# Patient Record
Sex: FEMALE | Race: WHITE | Employment: UNEMPLOYED | URBAN - METROPOLITAN AREA
[De-identification: names, ages, dates, MRNs, and addresses within clinical notes are randomized per-mention and may not be internally consistent; named-entity substitution may affect disease eponyms.]

---

## 2022-01-11 ENCOUNTER — TELEPHONE (OUTPATIENT)
Dept: RHEUMATOLOGY | Facility: CLINIC | Age: 77
End: 2022-01-11

## 2022-01-11 NOTE — TELEPHONE ENCOUNTER
Dr. Wilson Carrel office called wanting to update Dr. Jun Valencia on a new pt he has on his schedule.     Future Appointments   Date Time Provider Bettina Delgadillo   9/3/9187  3:63 PM [facility]

## 2022-01-13 ENCOUNTER — OFFICE VISIT (OUTPATIENT)
Dept: RHEUMATOLOGY | Facility: CLINIC | Age: 77
End: 2022-01-13
Payer: MEDICARE

## 2022-01-13 VITALS
HEIGHT: 64 IN | TEMPERATURE: 97 F | RESPIRATION RATE: 16 BRPM | DIASTOLIC BLOOD PRESSURE: 60 MMHG | SYSTOLIC BLOOD PRESSURE: 102 MMHG | WEIGHT: 126 LBS | BODY MASS INDEX: 21.51 KG/M2 | HEART RATE: 84 BPM

## 2022-01-13 DIAGNOSIS — L94.3 SCLERODACTYLY: ICD-10-CM

## 2022-01-13 DIAGNOSIS — M85.852 OSTEOPENIA OF NECK OF LEFT FEMUR: ICD-10-CM

## 2022-01-13 DIAGNOSIS — I10 ESSENTIAL HYPERTENSION: ICD-10-CM

## 2022-01-13 DIAGNOSIS — I73.00 RAYNAUD'S DISEASE WITHOUT GANGRENE: ICD-10-CM

## 2022-01-13 DIAGNOSIS — M34.9 SCLERODERMA (HCC): ICD-10-CM

## 2022-01-13 DIAGNOSIS — L98.491 ULCER OF FINGER, LIMITED TO BREAKDOWN OF SKIN (HCC): Primary | ICD-10-CM

## 2022-01-13 PROBLEM — E78.2 MIXED HYPERLIPIDEMIA: Status: ACTIVE | Noted: 2022-01-13

## 2022-01-13 PROBLEM — M85.859 OSTEOPENIA OF NECK OF FEMUR: Status: ACTIVE | Noted: 2022-01-13

## 2022-01-13 PROCEDURE — 99205 OFFICE O/P NEW HI 60 MIN: CPT | Performed by: INTERNAL MEDICINE

## 2022-01-13 RX ORDER — VALSARTAN 320 MG/1
320 TABLET ORAL DAILY
COMMUNITY
Start: 2021-11-19

## 2022-01-13 RX ORDER — CEPHALEXIN 500 MG/1
500 CAPSULE ORAL 3 TIMES DAILY
COMMUNITY
Start: 2022-01-09 | End: 2022-02-07 | Stop reason: ALTCHOICE

## 2022-01-13 RX ORDER — FOLIC ACID 1 MG/1
1 TABLET ORAL DAILY
Qty: 30 TABLET | Refills: 2 | Status: SHIPPED | OUTPATIENT
Start: 2022-01-13 | End: 2022-02-07

## 2022-01-13 RX ORDER — METHOTREXATE 2.5 MG/1
15 TABLET ORAL WEEKLY
Qty: 30 TABLET | Refills: 3 | Status: SHIPPED | OUTPATIENT
Start: 2022-01-13 | End: 2022-02-07

## 2022-01-13 RX ORDER — PREDNISONE 10 MG/1
50 TABLET ORAL DAILY
COMMUNITY
Start: 2022-01-07

## 2022-01-13 RX ORDER — IBUPROFEN 200 MG
1 CAPSULE ORAL 2 TIMES DAILY
COMMUNITY

## 2022-01-13 NOTE — PATIENT INSTRUCTIONS
Start Methotrexate 6 tablets per week for prevention of scleroderma progression. Methotrexate is a disease modifying immunosuppressive agent.   Hopefully it will decrease the inflammation in your body, therefore lancing the tendency for you to develop tigh

## 2022-01-13 NOTE — PROGRESS NOTES
[facility]  Report of Consultation    [facility] Patient Status:  No patient class for patient encounter    1945 MRN [id number]   Location [address] [place] [address] PCP No primary care provider on file.      Date of Consult:  22    Reason verapamil for last few days too. Jerry Zayas History:  PMH:       Essential hypertension. Mitral valve prolapse. Hyperlipidemia. Osteoporosis. Raynaud's     PSH:        1971  section.   [address] External Cream, APPLY TO AFFECTED AREA EVERY DAY (Patient not taking: Reported on 1/13/2022), Disp: 45 g, Rfl: 0  •  tacrolimus (PROTOPIC) 0.1 % Apply Externally Ointment, Apply to aa BID (Patient not taking: Reported on 1/13/2022), Disp: 100 g, Rfl: 0  • hematocrit 42.5 MCV 98 platelet count 135,553. Sed rate 4.  C-reactive protein 1.6. This is normal for [facility].   Normal ranges 0-10.  9/14/2021 sodium 140 potassium 4.3 chloride 104 BUN 24 creatinine 0.71 GFR 80 calcium 9.6 glucose 92 total protein as methotrexate.     Ulcer of finger, limited to breakdown of skin ([address]  (primary encounter diagnosis)  Sclerodactyly  Scleroderma ([address]  Raynaud's disease without gangrene  Osteopenia of neck of left femur  Essential hypertension    Recommendations:   Mary Borden

## 2022-02-03 ENCOUNTER — TELEPHONE (OUTPATIENT)
Dept: RHEUMATOLOGY | Facility: CLINIC | Age: 77
End: 2022-02-03

## 2022-02-03 NOTE — TELEPHONE ENCOUNTER
Spoke today with Dr. Adeel Paula about Kate Coad. Now Shaila Barry is dealing with some headaches, he put her on steroids the headaches improved. Suggested we recheck a sed rate and CRP make sure that her inflammation is under control, if she has a high sed rate over 50 with headaches and be concerned about need for a temporal artery biopsy. Also would like to see her in the office for recheck because recently appeared she had early scleroderma.   Dr. Chau Finn

## 2022-02-03 NOTE — TELEPHONE ENCOUNTER
Now dealing with headaches, when wakes up not severe. To be seen in office next Monday 2/7.   Dr. Golden Davenport.

## 2022-02-07 ENCOUNTER — OFFICE VISIT (OUTPATIENT)
Dept: RHEUMATOLOGY | Facility: CLINIC | Age: 77
End: 2022-02-07
Payer: MEDICARE

## 2022-02-07 VITALS
HEART RATE: 72 BPM | BODY MASS INDEX: 22.36 KG/M2 | RESPIRATION RATE: 16 BRPM | HEIGHT: 64 IN | SYSTOLIC BLOOD PRESSURE: 128 MMHG | TEMPERATURE: 97 F | WEIGHT: 131 LBS | DIASTOLIC BLOOD PRESSURE: 60 MMHG

## 2022-02-07 DIAGNOSIS — R53.1 WEAKNESS: ICD-10-CM

## 2022-02-07 DIAGNOSIS — L94.3 SCLERODACTYLY: Primary | ICD-10-CM

## 2022-02-07 DIAGNOSIS — I83.11 VARICOSE VEINS OF BOTH LOWER EXTREMITIES WITH INFLAMMATION: ICD-10-CM

## 2022-02-07 DIAGNOSIS — I83.12 VARICOSE VEINS OF BOTH LOWER EXTREMITIES WITH INFLAMMATION: ICD-10-CM

## 2022-02-07 DIAGNOSIS — I73.00 RAYNAUD'S DISEASE WITHOUT GANGRENE: ICD-10-CM

## 2022-02-07 DIAGNOSIS — I87.2 VENOUS INSUFFICIENCY OF LEFT LEG: ICD-10-CM

## 2022-02-07 DIAGNOSIS — E55.9 VITAMIN D DEFICIENCY: ICD-10-CM

## 2022-02-07 DIAGNOSIS — R51.9 NONINTRACTABLE HEADACHE, UNSPECIFIED CHRONICITY PATTERN, UNSPECIFIED HEADACHE TYPE: ICD-10-CM

## 2022-02-07 DIAGNOSIS — M34.9 SCLERODERMA (HCC): ICD-10-CM

## 2022-02-07 DIAGNOSIS — L98.491 ULCER OF FINGER, LIMITED TO BREAKDOWN OF SKIN (HCC): ICD-10-CM

## 2022-02-07 PROCEDURE — 99214 OFFICE O/P EST MOD 30 MIN: CPT | Performed by: INTERNAL MEDICINE

## 2022-02-07 RX ORDER — FOLIC ACID 1 MG/1
1 TABLET ORAL DAILY
Qty: 30 TABLET | Refills: 2 | Status: SHIPPED | OUTPATIENT
Start: 2022-02-07

## 2022-02-07 RX ORDER — METHOTREXATE 2.5 MG/1
15 TABLET ORAL WEEKLY
Qty: 30 TABLET | Refills: 3 | Status: SHIPPED | OUTPATIENT
Start: 2022-02-07 | End: 2022-02-07

## 2022-02-07 RX ORDER — FOLIC ACID 1 MG/1
1 TABLET ORAL DAILY
Qty: 30 TABLET | Refills: 2 | Status: CANCELLED | OUTPATIENT
Start: 2022-02-07

## 2022-02-07 RX ORDER — METHOTREXATE 2.5 MG/1
15 TABLET ORAL WEEKLY
Qty: 30 TABLET | Refills: 3 | Status: SHIPPED | OUTPATIENT
Start: 2022-02-07 | End: 2022-03-07

## 2022-02-07 RX ORDER — OMEPRAZOLE 20 MG/1
20 CAPSULE, DELAYED RELEASE ORAL
COMMUNITY

## 2022-02-07 RX ORDER — METHOTREXATE 2.5 MG/1
15 TABLET ORAL WEEKLY
Qty: 30 TABLET | Refills: 3 | Status: CANCELLED | OUTPATIENT
Start: 2022-02-07 | End: 2022-03-07

## 2022-02-08 ENCOUNTER — TELEPHONE (OUTPATIENT)
Dept: RHEUMATOLOGY | Facility: CLINIC | Age: 77
End: 2022-02-08

## 2022-02-17 ENCOUNTER — TELEPHONE (OUTPATIENT)
Dept: RHEUMATOLOGY | Facility: CLINIC | Age: 77
End: 2022-02-17

## 2022-02-17 NOTE — TELEPHONE ENCOUNTER
Received call from OSH radiology- pt has extensive superficial thrombophlebitis with extension to the common femoral. Not totally occlusive but does have features of subacute on chronic. I spoke to pt and recommended ER evaluation as she likely will require blood thinners. Pt would also like to speak to her normal rheumatologist.    I am covering for Dr. Lux Elena while he is out of the office. Notified Dr. Lux Elena and he will call pt/pt's  .      Shona Olvera, DO  [facility]  2/17/2022

## 2022-02-17 NOTE — TELEPHONE ENCOUNTER
Test results from [address] [place] [address]. Ultrasound of the head impression there is no halo sign visualized. Spectral broadening rapid upstroke slightly low resistive waveform seen in bilateral temporal arteries with forward flow end-diastolic this may be from atherosclerosis. Similar waveform in both right and left temporal arteries. No specific evidence for temporal arteritis. Of more concern was the left lower extremity deep venous duplex ultrasound. This showed #1 Short segment partially occlusive deep vein thrombus in the left common femoral vein at the saphenous femoral junction. #2 Long segment superficial thrombophlebitis with thrombosed entire left greater saphenous vein from the thigh to the ankle. Patient was notified. Referred to emergency room. Later I talked to  patient is at [facility] emergency room. Primary care physician taking over Dr. Laura Null.

## 2022-02-17 NOTE — TELEPHONE ENCOUNTER
Earlier today Terry Bettencourt went to [facility] for ultrasound of the head and ultrasound of the leg. Ultrasound of the leg showed that she had superficial blood clot which seem to be possibly extending into the deeper veins. Therefore she was told to go to the emergency room for evaluation and admission. This was by my partner Dr. Sage Bellamy who was on call and  took the original message. I have just talked to the . They decided to go to [facility] emergency room because her primary doctor is Dr. Shane Ayala and he is at [facility]. Padma Parson is in the emergency room being evaluated. Treatment will be determined by the emergency room [facility] obviously in conjunction with Dr. Neva Chaney. Results of the ultrasound the head are not known at this time by myself,  but they should be available to Dr. Neva Chaney at [facility] as [facility] is a [facility] as is [facility]. Dr. Neva Chaney to take over care of this case at this time.   Dr. Katherine Fortune

## 2022-03-02 ENCOUNTER — TELEPHONE (OUTPATIENT)
Dept: RHEUMATOLOGY | Facility: CLINIC | Age: 77
End: 2022-03-02

## 2022-03-02 NOTE — TELEPHONE ENCOUNTER
Dionne Montero from Dr Etienne Spore office called as Michael Parks pt had DVT and was hospitalized at [facility] 2/18/22. Records in [facility]. Routing to Dr Yulissa Pablo as Michael Parks.   Future Appointments   Date Time Provider Bettina Delgadillo   3/2/8639 37:71 PM Cherrie Gosselin, MD [facility]

## 2022-03-09 ENCOUNTER — OFFICE VISIT (OUTPATIENT)
Dept: RHEUMATOLOGY | Facility: CLINIC | Age: 77
End: 2022-03-09
Payer: MEDICARE

## 2022-03-09 VITALS
BODY MASS INDEX: 22.53 KG/M2 | HEIGHT: 64 IN | SYSTOLIC BLOOD PRESSURE: 122 MMHG | DIASTOLIC BLOOD PRESSURE: 58 MMHG | WEIGHT: 132 LBS | TEMPERATURE: 97 F | HEART RATE: 84 BPM | RESPIRATION RATE: 20 BRPM

## 2022-03-09 DIAGNOSIS — M34.9 SCLERODERMA (HCC): Primary | ICD-10-CM

## 2022-03-09 DIAGNOSIS — I83.11 VARICOSE VEINS OF BOTH LOWER EXTREMITIES WITH INFLAMMATION: ICD-10-CM

## 2022-03-09 DIAGNOSIS — I73.00 RAYNAUD'S DISEASE WITHOUT GANGRENE: ICD-10-CM

## 2022-03-09 DIAGNOSIS — I77.9 DIGITAL ARTERIAL OCCLUSIVE DISEASE (HCC): ICD-10-CM

## 2022-03-09 DIAGNOSIS — I82.412 ACUTE DEEP VEIN THROMBOSIS (DVT) OF FEMORAL VEIN OF LEFT LOWER EXTREMITY (HCC): ICD-10-CM

## 2022-03-09 DIAGNOSIS — I83.12 VARICOSE VEINS OF BOTH LOWER EXTREMITIES WITH INFLAMMATION: ICD-10-CM

## 2022-03-09 DIAGNOSIS — L98.491 ULCER OF FINGER, LIMITED TO BREAKDOWN OF SKIN (HCC): ICD-10-CM

## 2022-03-09 DIAGNOSIS — L94.3 SCLERODACTYLY: ICD-10-CM

## 2022-03-09 PROBLEM — I83.93 VARICOSE VEINS OF BOTH LOWER EXTREMITIES: Status: ACTIVE | Noted: 2022-03-09

## 2022-03-09 PROCEDURE — 99214 OFFICE O/P EST MOD 30 MIN: CPT | Performed by: INTERNAL MEDICINE

## 2022-03-09 RX ORDER — FOLIC ACID 1 MG/1
1 TABLET ORAL DAILY
Qty: 30 TABLET | Refills: 2 | Status: CANCELLED | OUTPATIENT
Start: 2022-03-09

## 2022-03-09 RX ORDER — PREDNISONE 1 MG/1
5 TABLET ORAL DAILY
Qty: 90 TABLET | Refills: 1 | Status: SHIPPED | OUTPATIENT
Start: 2022-03-09

## 2022-03-09 RX ORDER — METHOTREXATE 2.5 MG/1
20 TABLET ORAL WEEKLY
Qty: 40 TABLET | Refills: 3 | Status: SHIPPED | OUTPATIENT
Start: 2022-03-09 | End: 2022-04-06

## 2022-03-09 NOTE — PATIENT INSTRUCTIONS
PLan lower the Prednisone from 30 mg per day to 25 mg per day. In 2 weeks then try to lower Prednisone 20 mg per day. Increase Methotrexate to 8 tablets per week, take 4 on day, then 4 then next day, total of 8 per week. Folic acid 1 mg per day. Verapamil daily  Silendafil 20 mg twice a day. Eliquis twice a day. Stop nitroglycerin salve. Soak hands in warm water. Wear your cotton gloves. Dr Sandeep Cerna on Friday. Recheck labs in month. Return to office 1 month.

## 2022-04-11 ENCOUNTER — TELEPHONE (OUTPATIENT)
Dept: RHEUMATOLOGY | Facility: CLINIC | Age: 77
End: 2022-04-11

## 2022-04-11 ENCOUNTER — OFFICE VISIT (OUTPATIENT)
Dept: RHEUMATOLOGY | Facility: CLINIC | Age: 77
End: 2022-04-11
Payer: MEDICARE

## 2022-04-11 VITALS
BODY MASS INDEX: 22.71 KG/M2 | TEMPERATURE: 98 F | RESPIRATION RATE: 16 BRPM | WEIGHT: 133 LBS | HEART RATE: 64 BPM | SYSTOLIC BLOOD PRESSURE: 116 MMHG | DIASTOLIC BLOOD PRESSURE: 62 MMHG | HEIGHT: 64 IN

## 2022-04-11 DIAGNOSIS — M34.9 SCLERODERMA (HCC): Primary | ICD-10-CM

## 2022-04-11 DIAGNOSIS — I73.00 RAYNAUD'S DISEASE WITHOUT GANGRENE: ICD-10-CM

## 2022-04-11 DIAGNOSIS — L94.3 SCLERODACTYLY: ICD-10-CM

## 2022-04-11 PROCEDURE — 99214 OFFICE O/P EST MOD 30 MIN: CPT | Performed by: INTERNAL MEDICINE

## 2022-04-11 RX ORDER — FOLIC ACID 1 MG/1
1 TABLET ORAL DAILY
Qty: 30 TABLET | Refills: 2 | Status: CANCELLED | OUTPATIENT
Start: 2022-04-11

## 2022-04-11 RX ORDER — PREDNISONE 1 MG/1
5 TABLET ORAL DAILY
Qty: 90 TABLET | Refills: 1 | Status: CANCELLED | OUTPATIENT
Start: 2022-04-11

## 2022-04-11 NOTE — TELEPHONE ENCOUNTER
Discussed the case of Artice Meth with her primary Dr. Kirsten Larios. Concerned about kidney function being slightly up creatinine 1.36 or red count slightly down to 9.9. Could be an effect of scleroderma. There was also some blood in the urinalysis. He will have her see a nephrologist.  I will call her and have her decrease her methotrexate to 6 tablets from 8 in case this was causing some the bone marrow depression.   Dr. Roosevelt Bellamy

## 2022-04-11 NOTE — PATIENT INSTRUCTIONS
Plan LOWER Prednisone to 15 mg per day. Continue Methotrexate at 8 tablets per day, 4 one day, then 4 tabs the next day. Tylenol ok to use with Eliquis, but no ibuprofen. Use heat for the back/buttock pain. Use Verapamil; daily. Sidanfil 20 mg twice a day. Return to office 1 month.

## 2022-04-11 NOTE — TELEPHONE ENCOUNTER
Dr. Ignacia Alejandro phoned office in regards to pt appt for today. Concerned about current anemia and kidney function. Worried it could be from methotrexate. Would like Dr. Jean Claude Campos to be aware.

## 2022-04-12 NOTE — TELEPHONE ENCOUNTER
Edwin is to continue methotrexate, but to lower the dose from 8 tablets a week to 6 tablets a week due to concerns about amnemia, Hemoglobin  ia at  9.9. Talked with Dr. Pari Eldridge today, primary md.  Lower methotrexate to 6 tablets/week. Continue prednisone 15 mg a day but then in a week lower to 10 mg a day. Patient is also to be seen by nephrology due to a creatinine of 1.35. Dr. Griselda Pia. Discussed issues with patient/. Michael Machado

## 2022-04-13 ENCOUNTER — TELEPHONE (OUTPATIENT)
Dept: RHEUMATOLOGY | Facility: CLINIC | Age: 77
End: 2022-04-13

## 2022-04-13 NOTE — TELEPHONE ENCOUNTER
Test results from [facility] on [address] 4/8/22 white count 10.4 hemoglobin 9.9 hematocrit 31.4  platelet count 851,337. Sodium 142 potassium 3.9 chloride 105 BUN 35 creatinine 1.36 GFR 40 calcium 9.0 glucose 68 total protein 5.8 albumin 3.9 alkaline phosphatase 38 SGPT 18 SGOT 13 bilirubin 0.6 total cholesterol 169 triglyceride 157 HDL 54 LDL 84 CRP 3.0 which is normal for this lab. Sed rate 13. TSH 1.34. Hemoglobin A1c 6.2. Vitamin D 77 vitamin B12 903.   Dr. Harris Gloss

## 2022-05-19 ENCOUNTER — OFFICE VISIT (OUTPATIENT)
Dept: RHEUMATOLOGY | Facility: CLINIC | Age: 77
End: 2022-05-19
Payer: MEDICARE

## 2022-05-19 VITALS
RESPIRATION RATE: 16 BRPM | TEMPERATURE: 98 F | HEART RATE: 68 BPM | BODY MASS INDEX: 22.2 KG/M2 | HEIGHT: 64 IN | SYSTOLIC BLOOD PRESSURE: 122 MMHG | DIASTOLIC BLOOD PRESSURE: 58 MMHG | WEIGHT: 130 LBS

## 2022-05-19 DIAGNOSIS — L94.3 SCLERODACTYLY: ICD-10-CM

## 2022-05-19 DIAGNOSIS — I73.00 RAYNAUD'S DISEASE WITHOUT GANGRENE: ICD-10-CM

## 2022-05-19 DIAGNOSIS — M34.9 SCLERODERMA (HCC): Primary | ICD-10-CM

## 2022-05-19 DIAGNOSIS — N28.9 RENAL INSUFFICIENCY, MILD: ICD-10-CM

## 2022-05-19 PROCEDURE — 99214 OFFICE O/P EST MOD 30 MIN: CPT | Performed by: INTERNAL MEDICINE

## 2022-05-19 RX ORDER — PREDNISONE 1 MG/1
5 TABLET ORAL DAILY
Qty: 90 TABLET | Refills: 1 | Status: CANCELLED | OUTPATIENT
Start: 2022-05-19

## 2022-05-19 RX ORDER — PREDNISONE 10 MG/1
10 TABLET ORAL DAILY
Qty: 90 TABLET | Refills: 1 | Status: SHIPPED | OUTPATIENT
Start: 2022-05-19 | End: 2022-08-17

## 2022-05-19 RX ORDER — CAPTOPRIL 100 MG/1
100 TABLET ORAL 3 TIMES DAILY
COMMUNITY
End: 2022-05-19 | Stop reason: CLARIF

## 2022-05-19 RX ORDER — SILDENAFIL CITRATE 20 MG/1
20 TABLET ORAL DAILY
COMMUNITY

## 2022-05-19 RX ORDER — CAPTOPRIL 25 MG/1
1 TABLET ORAL 3 TIMES DAILY
COMMUNITY
Start: 2022-05-10

## 2022-05-19 RX ORDER — FOLIC ACID 1 MG/1
1 TABLET ORAL DAILY
Qty: 30 TABLET | Refills: 2 | Status: CANCELLED | OUTPATIENT
Start: 2022-05-19

## 2022-05-19 NOTE — PATIENT INSTRUCTIONS
Continue Methotrexate 6 tablets per week. Folic acid 1 mg per day. Refer to [facility] [address]. On Captopril 25 mg three tines a day. Anemic to have capsule endoscopy. Repeat CBC/ hemoglobin in a.m. at [facility] might need to have transfusion. Might need to be placed on CellCept in place of methotrexate. In addition to CBC check sed rate and complement level tomorrow. Case discussed with Dr. Anjel Wilkins primary care. Return to office 3 weeks.

## 2022-05-23 ENCOUNTER — DOCUMENTATION ONLY (OUTPATIENT)
Dept: RHEUMATOLOGY | Facility: CLINIC | Age: 77
End: 2022-05-23

## 2022-05-23 ENCOUNTER — TELEPHONE (OUTPATIENT)
Dept: RHEUMATOLOGY | Facility: CLINIC | Age: 77
End: 2022-05-23

## 2022-05-31 DIAGNOSIS — I73.00 RAYNAUD'S DISEASE WITHOUT GANGRENE: ICD-10-CM

## 2022-05-31 DIAGNOSIS — L94.3 SCLERODACTYLY: ICD-10-CM

## 2022-05-31 DIAGNOSIS — M34.9 SCLERODERMA (HCC): ICD-10-CM

## 2022-05-31 RX ORDER — FOLIC ACID 1 MG/1
TABLET ORAL
Qty: 30 TABLET | Refills: 2 | Status: SHIPPED | OUTPATIENT
Start: 2022-05-31

## 2022-06-01 RX ORDER — METHOTREXATE 2.5 MG/1
TABLET ORAL
Qty: 77 TABLET | Refills: 0 | OUTPATIENT
Start: 2022-06-01

## 2022-06-09 ENCOUNTER — VIRTUAL PHONE E/M (OUTPATIENT)
Dept: RHEUMATOLOGY | Facility: CLINIC | Age: 77
End: 2022-06-09
Payer: MEDICARE

## 2022-06-09 DIAGNOSIS — M34.9 SCLERODERMA (HCC): ICD-10-CM

## 2022-06-09 DIAGNOSIS — S32.040G COMPRESSION FRACTURE OF L4 VERTEBRA WITH DELAYED HEALING: Primary | ICD-10-CM

## 2022-06-09 DIAGNOSIS — I73.00 RAYNAUD'S DISEASE WITHOUT GANGRENE: ICD-10-CM

## 2022-06-09 DIAGNOSIS — L94.3 SCLERODACTYLY: ICD-10-CM

## 2022-06-09 PROBLEM — S32.050A COMPRESSION FRACTURE OF FIFTH LUMBAR VERTEBRA (HCC): Status: ACTIVE | Noted: 2022-06-09

## 2022-06-09 PROCEDURE — 99443 PHONE E/M BY PHYS 21-30 MIN: CPT | Performed by: INTERNAL MEDICINE

## 2022-06-09 RX ORDER — HYDROCODONE BITARTRATE AND ACETAMINOPHEN 5; 325 MG/1; MG/1
1-2 TABLET ORAL EVERY 4 HOURS PRN
COMMUNITY
Start: 2022-06-07

## 2022-06-09 NOTE — PROGRESS NOTES
[facility]  Dr. Katherine Fortune Progress Note     Subjective:   Aaliyah Hutchinson is a(n) 67year old female. Current complaints: Patient presents with: Follow - Up: LOV 5-19-22; changed today to virtual phone as pt was in ER 6-6-22 with new fracture at L4; pt c/o worsening pain today; she is at home with ; all records in [address] overnight 6/6/22 at [facility]. Acute fracture L4. Might need kyphoplasty. Javi Manning last Novemeber no recent falls. Generally walks with a walker. Now in too much pain, hard to mobilize. Scleroderma - hands have tight skin. Fingers are sensitive, numb in fingers. Can't mobilize this morning. Pain in her back. No chest pain. No shortness of breath. No fever. Objective:   Telephone visit - 30 minutes. 6/6/2022 from [facility] count 14.9 hemoglobin 9.9 hematocrit 31.4  platelet count 369,596. Sodium 142 potassium 4.2 chloride 103 BUN 23 creatinine 1.28 GFR 43 calcium 9.4 glucose 119 total protein 6.5 albumin 4.2   ALT 19 AST 25 alkaline phosphatase 52 bilirubin 0.6. . X-ray lumbar spine there is apparent superior endplate height loss at L4 vertebral body which is not present on previous x-ray. Mild to moderate multilevel lumbar spine alkalosis is present. There is thoracolumbar dextroscoliosis. X-ray thoracic spine shows mild to moderate thoracic spondylosis. No evidence of fracture. CT scan of abdomen and pelvis shows #1 crescentic hyperattenuation along the lower pole of left kidney suspicious for mild pericapsular retroperitoneal hemorrhage. #2 development of L4 superior endplate compression fracture with mild vertebral height loss since prior CT of 2/17/2022. Appearance suggests an acute fracture. #3 slight increased size of bilateral pleural effusions.   Assessment:   Sclerodactyly  Scleroderma (hcc)  Raynaud's disease without gangrene  Compression fracture of l4 vertebra with delayed healing  (primary encounter diagnosis)  Acute compression fracture of L4. Recommend kyphoplasty. Plan:   Patient Instructions   Called Dr Prabhjot Diggs regarding getting a kyphoplasty at L4 for new compression fracture. He will rediscuss issue with patient. She was in the hospital on June 6 at [facility] and she deferred having the kyphoplasty 1 to see if she would heal on her own. Today is June 9 and she is in significant pain can hardly get out of bed my previous time to do the kyphoplasty. Regarding scleroderma and seems to be no different she has tight skin in her fingers some numbness in her fingers some discomfort in her fingers. No new problems. We will still try to arrange for referral to [facility] scler[facility].  given [facility] In the meantime remain on methotrexate 6 tablets/week which equals 15 mg. Folic acid 1 mg/day. Calcium 500 mg with vitamin D 400 units daily. Recommend discussing with Dr. Prabhjot Diggs use of either Fosamax or Actonel to prevent further compression fractures. Current labs show stabilized kidney liver function. Hemoglobin was 9.6 which is reasonable. Return to office for my evaluation in 1 month.         Paola Nicholson MD 2/5/3771 1:77 AM

## 2022-06-29 ENCOUNTER — TELEPHONE (OUTPATIENT)
Dept: RHEUMATOLOGY | Facility: CLINIC | Age: 77
End: 2022-06-29

## 2022-06-29 NOTE — TELEPHONE ENCOUNTER
Dr Nirav Haywood  [phone]  Spoke to Jason Tsang at [facility] to schedule in clinic evaluation with one of rheumatologists; first available for scleroderma is 8/15/22 at 3pm; was added to wait list.    Moniquejoselito. [address] [facility]  14th floor  [address] [facility] [place]    Returned call to ; Future Appointments   Date Time Provider Bettina Leona   9/13/1553  3:86 PM Daria Da Silva MD [facility] [place] [facility]      verbalized understanding and appreciation.

## 2022-07-14 ENCOUNTER — OFFICE VISIT (OUTPATIENT)
Dept: RHEUMATOLOGY | Facility: CLINIC | Age: 77
End: 2022-07-14
Payer: MEDICARE

## 2022-07-14 VITALS
HEART RATE: 84 BPM | DIASTOLIC BLOOD PRESSURE: 52 MMHG | RESPIRATION RATE: 16 BRPM | BODY MASS INDEX: 22.2 KG/M2 | HEIGHT: 64 IN | SYSTOLIC BLOOD PRESSURE: 138 MMHG | OXYGEN SATURATION: 95 % | WEIGHT: 130 LBS

## 2022-07-14 DIAGNOSIS — L94.3 SCLERODACTYLY: ICD-10-CM

## 2022-07-14 DIAGNOSIS — I10 ESSENTIAL HYPERTENSION: ICD-10-CM

## 2022-07-14 DIAGNOSIS — I73.00 RAYNAUD'S DISEASE WITHOUT GANGRENE: ICD-10-CM

## 2022-07-14 DIAGNOSIS — M34.9 SCLERODERMA (HCC): Primary | ICD-10-CM

## 2022-07-14 DIAGNOSIS — N28.9 RENAL INSUFFICIENCY, MILD: ICD-10-CM

## 2022-07-14 PROCEDURE — 99214 OFFICE O/P EST MOD 30 MIN: CPT | Performed by: INTERNAL MEDICINE

## 2022-07-14 RX ORDER — HYDRALAZINE HYDROCHLORIDE 10 MG/1
20 TABLET, FILM COATED ORAL 2 TIMES DAILY
COMMUNITY

## 2022-07-14 NOTE — PATIENT INSTRUCTIONS
Continue Methotrexate 6 tablets per week. Folic acid 1 mg per week. Captopril 25 mg three times per day. Hydralazine twice a day,    Eat a well-balanced diet high in fruits and vegetables. Avoid salt. Avoid fat. I know you are tired and feel weak but try to exercise a bit by doing some walking with your walker. Sitting all the time leads to atrophy, you do need some weightbearing which helps your osteoporosis. For osteoporosis you are on Prolia every 6 months. You are status post a kyphoplasty at L4 where there was a compression fracture. Return to office for recheck in 2 months in September after your visit with the rheumatology department at [facility].

## 2022-08-16 ENCOUNTER — TELEPHONE (OUTPATIENT)
Dept: RHEUMATOLOGY | Facility: CLINIC | Age: 77
End: 2022-08-16

## 2022-08-29 DIAGNOSIS — I73.00 RAYNAUD'S DISEASE WITHOUT GANGRENE: ICD-10-CM

## 2022-08-29 DIAGNOSIS — M34.9 SCLERODERMA (HCC): ICD-10-CM

## 2022-08-29 DIAGNOSIS — L94.3 SCLERODACTYLY: ICD-10-CM

## 2022-08-29 RX ORDER — FOLIC ACID 1 MG/1
TABLET ORAL
Qty: 30 TABLET | Refills: 2 | Status: SHIPPED | OUTPATIENT
Start: 2022-08-29

## 2022-08-29 RX ORDER — PREDNISONE 1 MG/1
TABLET ORAL
Qty: 90 TABLET | Refills: 1 | Status: SHIPPED | OUTPATIENT
Start: 2022-08-29

## 2022-08-29 NOTE — TELEPHONE ENCOUNTER
Future Appointments   Date Time Provider Bettina Delgadillo   7/66/7439 41:79 AM Patricia Doss MD [facility] [place]     LOV 7/14/22  RTO in 2mo    Phoned pt and spouse, pt currently taking 5mg prednisone daily for the last 3-4 days. Aware that the plan is to gradually wean from medication. Would like to know for how long patient should stay at 5mg. Please advise.

## 2022-09-22 ENCOUNTER — OFFICE VISIT (OUTPATIENT)
Dept: RHEUMATOLOGY | Facility: CLINIC | Age: 77
End: 2022-09-22

## 2022-09-22 VITALS
BODY MASS INDEX: 22.2 KG/M2 | HEIGHT: 64 IN | DIASTOLIC BLOOD PRESSURE: 50 MMHG | TEMPERATURE: 98 F | WEIGHT: 130 LBS | SYSTOLIC BLOOD PRESSURE: 110 MMHG

## 2022-09-22 DIAGNOSIS — L94.3 SCLERODACTYLY: ICD-10-CM

## 2022-09-22 DIAGNOSIS — I73.00 RAYNAUD'S DISEASE WITHOUT GANGRENE: ICD-10-CM

## 2022-09-22 DIAGNOSIS — M34.9 SCLERODERMA (HCC): Primary | ICD-10-CM

## 2022-09-22 PROCEDURE — 99214 OFFICE O/P EST MOD 30 MIN: CPT | Performed by: INTERNAL MEDICINE

## 2022-09-22 RX ORDER — FOLIC ACID 1 MG/1
1 TABLET ORAL DAILY
Qty: 30 TABLET | Refills: 2 | Status: CANCELLED
Start: 2022-09-22

## 2022-09-22 RX ORDER — PREDNISONE 1 MG/1
5 TABLET ORAL DAILY
Qty: 90 TABLET | Refills: 1 | Status: CANCELLED
Start: 2022-09-22

## 2022-09-22 NOTE — PATIENT INSTRUCTIONS
Continue Actmra injction subcutaneous once a wek for treatment of scleroderma. Continue Methotrexate 6 tablets per week, 3 on Saturday, 3 on Sunday. Folic acid 1 mg/day. Captopril 25 mg 3 times a day. Prednisone 5 mg per day. Labs should be done next couple of weeks as ordered by Dr. Chet Grace from [facility] to include CBC, CHEM profile, and sed rate. Regarding upcoming flu vaccination and a new COVID vaccination, I would recommend doing these. Weekly you do the vaccinations you will skip your Actemra and methotrexate. Discussed this also with Dr. Milas Gitelman.   Return to office for recheck 2 months

## 2022-11-23 ENCOUNTER — TELEMEDICINE (OUTPATIENT)
Dept: RHEUMATOLOGY | Facility: CLINIC | Age: 77
End: 2022-11-23
Payer: MEDICARE

## 2022-11-23 VITALS — WEIGHT: 115 LBS | BODY MASS INDEX: 19.63 KG/M2 | HEIGHT: 64 IN

## 2022-11-23 DIAGNOSIS — L94.3 SCLERODACTYLY: ICD-10-CM

## 2022-11-23 DIAGNOSIS — I73.00 RAYNAUD'S DISEASE WITHOUT GANGRENE: ICD-10-CM

## 2022-11-23 DIAGNOSIS — M34.9 SCLERODERMA (HCC): ICD-10-CM

## 2022-11-23 PROCEDURE — 99213 OFFICE O/P EST LOW 20 MIN: CPT | Performed by: INTERNAL MEDICINE

## 2022-11-23 RX ORDER — TOCILIZUMAB 180 MG/ML
162 INJECTION, SOLUTION SUBCUTANEOUS WEEKLY
COMMUNITY

## 2022-11-23 RX ORDER — LISINOPRIL 2.5 MG/1
2.5 TABLET ORAL DAILY
COMMUNITY
Start: 2022-10-30

## 2022-11-23 RX ORDER — FOLIC ACID 1 MG/1
1 TABLET ORAL DAILY
Qty: 30 TABLET | Refills: 2 | Status: CANCELLED
Start: 2022-11-23

## 2022-11-23 RX ORDER — METOPROLOL SUCCINATE 50 MG/1
50 TABLET, EXTENDED RELEASE ORAL 2 TIMES DAILY
COMMUNITY
Start: 2022-09-27

## 2022-11-23 RX ORDER — SILDENAFIL CITRATE 20 MG/1
20 TABLET ORAL 2 TIMES DAILY
COMMUNITY
Start: 2022-04-04

## 2022-11-23 RX ORDER — PREDNISONE 1 MG/1
5 TABLET ORAL DAILY
Qty: 90 TABLET | Refills: 1 | Status: CANCELLED
Start: 2022-11-23

## 2022-11-23 NOTE — PATIENT INSTRUCTIONS
Continue Actemra weekly injection. ,  Lower the Methotrexate from 6 tablets per week to 4 tablets per week due to hair loss. Increase Folic scid 2 mg per day form 1 mg per day for hair growth. Add Biotin 5 mg per day a vitamin for hair growth. Add collagen also for hair growth. Collagen can be obtained either as a powder that you add to glass of water or he can actually buy collagen pills through the Internet that you take 1 a day. Prednisone 5 mg per day. Captopril 25 mg three per day. Eat a well-balanced diet with fruit, vegetables. Get 8 hours of sleep a night. Continue your video visits with Dr. Helga Sidhu expert from [facility]. Lab tests routinely ordered by Dr. Rene Gutierrez. Plan to see me in the office in 2 months which would be in January.

## 2022-12-05 ENCOUNTER — TELEPHONE (OUTPATIENT)
Dept: RHEUMATOLOGY | Facility: CLINIC | Age: 77
End: 2022-12-05

## 2022-12-05 DIAGNOSIS — I73.00 RAYNAUD'S DISEASE WITHOUT GANGRENE: ICD-10-CM

## 2022-12-05 DIAGNOSIS — L94.3 SCLERODACTYLY: ICD-10-CM

## 2022-12-05 DIAGNOSIS — M34.9 SCLERODERMA (HCC): ICD-10-CM

## 2022-12-05 RX ORDER — FOLIC ACID 1 MG/1
2 TABLET ORAL DAILY
Qty: 180 TABLET | Refills: 0 | Status: SHIPPED | OUTPATIENT
Start: 2022-12-05

## 2023-01-30 ENCOUNTER — OFFICE VISIT (OUTPATIENT)
Dept: RHEUMATOLOGY | Facility: CLINIC | Age: 78
End: 2023-01-30
Payer: MEDICARE

## 2023-01-30 VITALS
WEIGHT: 114.63 LBS | BODY MASS INDEX: 20 KG/M2 | HEART RATE: 80 BPM | DIASTOLIC BLOOD PRESSURE: 64 MMHG | RESPIRATION RATE: 18 BRPM | SYSTOLIC BLOOD PRESSURE: 118 MMHG

## 2023-01-30 DIAGNOSIS — L94.3 SCLERODACTYLY: ICD-10-CM

## 2023-01-30 DIAGNOSIS — I73.00 RAYNAUD'S DISEASE WITHOUT GANGRENE: ICD-10-CM

## 2023-01-30 DIAGNOSIS — M34.9 SCLERODERMA (HCC): Primary | ICD-10-CM

## 2023-01-30 DIAGNOSIS — I65.21 STENOSIS OF RIGHT CAROTID ARTERY: ICD-10-CM

## 2023-01-30 DIAGNOSIS — S32.040G COMPRESSION FRACTURE OF L4 VERTEBRA WITH DELAYED HEALING: ICD-10-CM

## 2023-01-30 RX ORDER — ASPIRIN 81 MG/1
81 TABLET ORAL DAILY
COMMUNITY

## 2023-01-30 RX ORDER — MEMANTINE HYDROCHLORIDE 5 MG/1
10 TABLET ORAL DAILY
COMMUNITY
Start: 2023-01-16

## 2023-01-31 NOTE — PATIENT INSTRUCTIONS
For scleroderma maintain Actemra injection subcu once a week. Continue methotrexate weekly 4 tablets. Folic acid 2 mg daily. Continue Biotin 5 mg a day for hair loss. Prednisone is 5 mg/day. Captopril 25 mg 3 times per day  Follow-up with scleroderma expert at [address] Dr. Eddie Leroy. Eat a well-balanced diet but focus on more fruit vegetables and lean cuts of meat. Continue your Lipitor 40 mg a day. You have 80% blockage of your right carotid, it can be worsened if you eat junk food and foods high in fat. Therefore eat the proper low fat diet. Try to do  regular exercise by walking and stretching to improve your physical condition. Continue your physical therapy. Regular labs with Dr. Garth Dhaliwal your primary care to include CBC, chemistry profile and sed rate. Return to office for recheck 3 months.

## 2023-03-11 DIAGNOSIS — I73.00 RAYNAUD'S DISEASE WITHOUT GANGRENE: ICD-10-CM

## 2023-03-11 DIAGNOSIS — M34.9 SCLERODERMA (HCC): ICD-10-CM

## 2023-03-11 DIAGNOSIS — L94.3 SCLERODACTYLY: ICD-10-CM

## 2023-03-13 RX ORDER — FOLIC ACID 1 MG/1
TABLET ORAL
Qty: 180 TABLET | Refills: 3 | Status: SHIPPED | OUTPATIENT
Start: 2023-03-13

## 2023-03-13 NOTE — TELEPHONE ENCOUNTER
Future Appointments   Date Time Provider Bettina Leona   8/01/2657  5:78 PM Dee Dee Painter MD [facility] [place]     LOV  1/30/2023    Last refill  12/5/2022  180 tabs, 0 refills

## 2023-03-27 RX ORDER — PREDNISONE 1 MG/1
TABLET ORAL
Qty: 90 TABLET | Refills: 1 | Status: SHIPPED | OUTPATIENT
Start: 2023-03-27

## 2023-03-27 NOTE — TELEPHONE ENCOUNTER
Future Appointments   Date Time Provider Bettina Leona   7/85/7321  1:39 PM Demarcus Brenner MD [facility] [place]     LOV  1/30/2023    Last refill  8/29/2022  90 tabs, 1 refill

## 2023-04-27 ENCOUNTER — OFFICE VISIT (OUTPATIENT)
Dept: RHEUMATOLOGY | Facility: CLINIC | Age: 78
End: 2023-04-27
Payer: MEDICARE

## 2023-04-27 VITALS
WEIGHT: 117 LBS | RESPIRATION RATE: 16 BRPM | SYSTOLIC BLOOD PRESSURE: 110 MMHG | BODY MASS INDEX: 19.97 KG/M2 | HEIGHT: 64 IN | DIASTOLIC BLOOD PRESSURE: 70 MMHG | TEMPERATURE: 98 F

## 2023-04-27 DIAGNOSIS — L94.3 SCLERODACTYLY: ICD-10-CM

## 2023-04-27 DIAGNOSIS — G89.29 CHRONIC BILATERAL LOW BACK PAIN WITHOUT SCIATICA: ICD-10-CM

## 2023-04-27 DIAGNOSIS — S32.040G COMPRESSION FRACTURE OF L4 VERTEBRA WITH DELAYED HEALING: ICD-10-CM

## 2023-04-27 DIAGNOSIS — M85.852 OSTEOPENIA OF NECK OF LEFT FEMUR: ICD-10-CM

## 2023-04-27 DIAGNOSIS — M54.50 CHRONIC BILATERAL LOW BACK PAIN WITHOUT SCIATICA: ICD-10-CM

## 2023-04-27 DIAGNOSIS — M34.9 SCLERODERMA (HCC): Primary | ICD-10-CM

## 2023-04-27 DIAGNOSIS — I73.00 RAYNAUD'S DISEASE WITHOUT GANGRENE: ICD-10-CM

## 2023-04-27 PROCEDURE — 99214 OFFICE O/P EST MOD 30 MIN: CPT | Performed by: INTERNAL MEDICINE

## 2023-04-27 RX ORDER — ATORVASTATIN CALCIUM 40 MG/1
40 TABLET, FILM COATED ORAL DAILY
COMMUNITY
Start: 2023-04-23

## 2023-04-27 RX ORDER — FLUOXETINE HYDROCHLORIDE 20 MG/1
20 CAPSULE ORAL
COMMUNITY
Start: 2023-04-17

## 2023-04-27 RX ORDER — LISINOPRIL 10 MG/1
10 TABLET ORAL DAILY
COMMUNITY
Start: 2023-04-02

## 2023-04-27 RX ORDER — DONEPEZIL HYDROCHLORIDE 5 MG/1
5 TABLET, FILM COATED ORAL NIGHTLY
COMMUNITY
Start: 2023-01-07

## 2023-04-27 NOTE — PATIENT INSTRUCTIONS
Scleroderma at this time appears stable. Continue methotrexate 4 tablets/week. Folic acid 1 mg a day. Actemra 162 mg per week subcutaneous,. Biotin 5 mg per day for hair growth. Prednisone 5 mg per day. Captopril 25 mg three times per week. Back pain is a problem . For mild pain use ES Tylenol 500 mg 3- times a day. Dewanda Manzanilla is not tolerated well. For severe back pain consider use of medical marijuana. Try CBD products first.   Add THC  to the CBD only if necessary. Another alternate would be use of Tylenol #3 in place of Norco.  Labs CBC/CMP  every 3 months.

## 2023-05-16 NOTE — PATIENT INSTRUCTIONS
Commit to sit  Preop Nurse sat down with the patient during preop to provide education on the upcoming procedure and answered all patient questions.     Your headaches might be related to a post concussion syndrome. However there is concern about temporal arteritis an inflammatory cause of headaches,  To check for this an ultrasound will be done on the left side of the head. Lower your Prednisone to 40 mg per day. You have dilated varicose veins and venous insufficieny of both legs. Dr Andreia De Leon is a vascular surgeon at [facility]. A left leg ultrasound will be done to rule out a blood clot in the left leg. Check labs now including CBC, Chem Profile, Sed rate, CRP, LEOBARDO. Methotrexate at 6  Tablets per week. Folic acid 1 mg per day. Use Nitrobid ointment us to 3 tines per day as needed. Use the neosporin ointment also/  Stay warm, plenty of warm clothing. Do testing at [facility]. Return to office 4 weeks.

## 2023-07-03 NOTE — TELEPHONE ENCOUNTER
Future Appointments   Date Time Provider Bettina Leona   3/63/8858  7:71 PM Elliot Tamez MD [facility] [place]     LOV  4/27/2023    Last refill 5/31/2022  72 tabs, 0 refills

## 2023-10-02 RX ORDER — PREDNISONE 5 MG/1
5 TABLET ORAL DAILY
Qty: 90 TABLET | Refills: 1 | Status: SHIPPED | OUTPATIENT
Start: 2023-10-02

## 2023-10-02 NOTE — TELEPHONE ENCOUNTER
Future Appointments   Date Time Provider Bettina Rodartei   08/72/0670 04:48 PM Polly Hernandez MD [facility] [place]     LOV  4/27/2023    Last refill  3/27/2023  90 tabs, 1 refill

## 2023-10-11 RX ORDER — AMOXICILLIN 500 MG/1
CAPSULE ORAL AS DIRECTED
COMMUNITY
Start: 2023-09-05

## 2023-10-11 RX ORDER — ONDANSETRON 4 MG/1
4 TABLET, ORALLY DISINTEGRATING ORAL
COMMUNITY
Start: 2022-10-17

## 2023-10-11 RX ORDER — DOXYCYCLINE HYCLATE 100 MG
100 TABLET ORAL 2 TIMES DAILY
COMMUNITY
Start: 2023-09-08 | End: 2023-10-12

## 2023-10-12 ENCOUNTER — OFFICE VISIT (OUTPATIENT)
Dept: RHEUMATOLOGY | Facility: CLINIC | Age: 78
End: 2023-10-12
Payer: MEDICARE

## 2023-10-12 VITALS
BODY MASS INDEX: 19.7 KG/M2 | DIASTOLIC BLOOD PRESSURE: 58 MMHG | RESPIRATION RATE: 16 BRPM | SYSTOLIC BLOOD PRESSURE: 130 MMHG | HEIGHT: 64 IN | WEIGHT: 115.38 LBS | HEART RATE: 54 BPM | TEMPERATURE: 98 F

## 2023-10-12 DIAGNOSIS — N28.9 RENAL INSUFFICIENCY, MILD: ICD-10-CM

## 2023-10-12 DIAGNOSIS — L94.3 SCLERODACTYLY: ICD-10-CM

## 2023-10-12 DIAGNOSIS — M35.9 INTERSTITIAL LUNG DISEASE DUE TO COLLAGEN VASCULAR DISEASE (HCC): ICD-10-CM

## 2023-10-12 DIAGNOSIS — M34.9 SCLERODERMA (HCC): Primary | ICD-10-CM

## 2023-10-12 DIAGNOSIS — M54.50 CHRONIC BILATERAL LOW BACK PAIN WITHOUT SCIATICA: ICD-10-CM

## 2023-10-12 DIAGNOSIS — J84.9 INTERSTITIAL LUNG DISEASE DUE TO COLLAGEN VASCULAR DISEASE (HCC): ICD-10-CM

## 2023-10-12 DIAGNOSIS — I65.21 STENOSIS OF RIGHT CAROTID ARTERY: ICD-10-CM

## 2023-10-12 DIAGNOSIS — G89.29 CHRONIC BILATERAL LOW BACK PAIN WITHOUT SCIATICA: ICD-10-CM

## 2023-10-12 PROCEDURE — 99214 OFFICE O/P EST MOD 30 MIN: CPT | Performed by: INTERNAL MEDICINE

## 2024-02-05 RX ORDER — METHOTREXATE 2.5 MG/1
TABLET ORAL
Qty: 60 TABLET | Refills: 1 | Status: SHIPPED | OUTPATIENT
Start: 2024-02-05

## 2024-02-05 NOTE — TELEPHONE ENCOUNTER
LOV: 10/12/2023    RTC: 3 months    Future Appointments   Date Time Provider Department Center   4/11/2024  2:20 PM [facility]   LABS: not ordered at this time    LAST REFILL: 7/5/2023, Quantity 60 tablets, Refills 1    Dr Ferreira-- orders pending, approve if agreeable

## 2024-03-09 DIAGNOSIS — L94.3 SCLERODACTYLY: ICD-10-CM

## 2024-03-09 DIAGNOSIS — I73.00 RAYNAUD'S DISEASE WITHOUT GANGRENE: ICD-10-CM

## 2024-03-09 DIAGNOSIS — M34.9 SCLERODERMA (HCC): ICD-10-CM

## 2024-03-11 RX ORDER — FOLIC ACID 1 MG/1
2 TABLET ORAL DAILY
Qty: 180 TABLET | Refills: 3 | Status: SHIPPED | OUTPATIENT
Start: 2024-03-11

## 2024-03-11 NOTE — TELEPHONE ENCOUNTER
LOV: 10/12/2023    RTC: 6 months   Future Appointments   Date Time Provider Department Center   4/11/2024  2:20 PM [facility]   LABS: not ordered at this time.     LAST REFILL: 3/13/2023, Quantity 180 tablets, Refills 3    Dr Ferreira-- orders pending, approve if agreeable.

## 2024-03-25 RX ORDER — PREDNISONE 5 MG/1
5 TABLET ORAL DAILY
Qty: 90 TABLET | Refills: 1 | Status: SHIPPED | OUTPATIENT
Start: 2024-03-25

## 2024-03-25 NOTE — TELEPHONE ENCOUNTER
Last office visit: 10/12/2023    Next Rheum Apt:4/11/2024 Talat Ferreira MD    Last fill: 10/2/2023  90 tabs, 1 refill     Labs:   No results found for: \"CREATSERUM\", \"GFR\", \"ALKPHO\", \"AST\", \"ALT\", \"BILT\", \"TP\", \"ALB\"    No results found for: \"WBC\", \"HGB\", \"PLT\", \"NEPRELIM\", \"NEUTABS\", \"LYMPHABS\", \"NEUT\", \"LYMPH\", \"NEPERCENT\", \"LYPERCENT\", \"NE\", \"LYMABS\"

## 2024-03-26 ENCOUNTER — TELEPHONE (OUTPATIENT)
Dept: RHEUMATOLOGY | Facility: CLINIC | Age: 79
End: 2024-03-26

## 2024-04-11 ENCOUNTER — OFFICE VISIT (OUTPATIENT)
Dept: RHEUMATOLOGY | Facility: CLINIC | Age: 79
End: 2024-04-11
Payer: MEDICARE

## 2024-04-11 VITALS
WEIGHT: 116 LBS | DIASTOLIC BLOOD PRESSURE: 60 MMHG | SYSTOLIC BLOOD PRESSURE: 122 MMHG | TEMPERATURE: 99 F | BODY MASS INDEX: 19.81 KG/M2 | OXYGEN SATURATION: 97 % | HEART RATE: 58 BPM | HEIGHT: 64 IN | RESPIRATION RATE: 14 BRPM

## 2024-04-11 DIAGNOSIS — I73.00 RAYNAUD'S DISEASE WITHOUT GANGRENE: ICD-10-CM

## 2024-04-11 DIAGNOSIS — M35.9 INTERSTITIAL LUNG DISEASE DUE TO COLLAGEN VASCULAR DISEASE (HCC): ICD-10-CM

## 2024-04-11 DIAGNOSIS — N28.9 RENAL INSUFFICIENCY, MILD: ICD-10-CM

## 2024-04-11 DIAGNOSIS — I65.21 STENOSIS OF RIGHT CAROTID ARTERY: ICD-10-CM

## 2024-04-11 DIAGNOSIS — M34.9 SCLERODERMA (HCC): Primary | ICD-10-CM

## 2024-04-11 DIAGNOSIS — L94.3 SCLERODACTYLY: ICD-10-CM

## 2024-04-11 DIAGNOSIS — M85.852 OSTEOPENIA OF NECK OF LEFT FEMUR: ICD-10-CM

## 2024-04-11 DIAGNOSIS — J84.9 INTERSTITIAL LUNG DISEASE DUE TO COLLAGEN VASCULAR DISEASE (HCC): ICD-10-CM

## 2024-04-11 DIAGNOSIS — E78.2 MIXED HYPERLIPIDEMIA: ICD-10-CM

## 2024-04-11 PROCEDURE — 99214 OFFICE O/P EST MOD 30 MIN: CPT | Performed by: INTERNAL MEDICINE

## 2024-04-11 RX ORDER — APIXABAN 2.5 MG/1
2.5 TABLET, FILM COATED ORAL 2 TIMES DAILY
COMMUNITY
Start: 2024-03-19

## 2024-04-11 RX ORDER — EZETIMIBE 10 MG/1
10 TABLET ORAL DAILY
COMMUNITY
Start: 2024-04-05

## 2024-04-11 RX ORDER — ATORVASTATIN CALCIUM 80 MG/1
80 TABLET, FILM COATED ORAL DAILY
COMMUNITY
Start: 2024-03-18

## 2024-04-11 RX ORDER — PREDNISONE 5 MG/1
5 TABLET ORAL DAILY
Qty: 90 TABLET | Refills: 1 | Status: SHIPPED | OUTPATIENT
Start: 2024-04-11

## 2024-04-11 RX ORDER — METOPROLOL SUCCINATE 25 MG/1
25 TABLET, EXTENDED RELEASE ORAL DAILY
COMMUNITY
Start: 2024-02-04

## 2024-04-11 RX ORDER — METHOTREXATE 2.5 MG/1
15 TABLET ORAL WEEKLY
Qty: 72 TABLET | Refills: 1 | Status: CANCELLED | OUTPATIENT
Start: 2024-04-11 | End: 2025-04-06

## 2024-04-11 NOTE — PROGRESS NOTES
[facility]  Dr. Ferreira Progress Note     Subjective:   Althea Jeffries is a(n) 78 year old female.   Current complaints:   Chief Complaint   Patient presents with    Follow - Up     Scleroderma - Pt started having bouts of vertigo/dizziness this week where room feels like it is spinning.  Pt is taking PREDNISONE 5 MG, folic acid 1 MG, methotrexate 2.5 MG   Scleroderma patient.   Methotrexate 4 tablets per week.  Folic acid 1 mg per day.  This Sunday attack of vertigo was severe.  Primary Dr. Garcia.  Right carotid 70% blockage.  On atorvastatin.  Dr. JESUS tried an additional cholesterol lowering medication - zetia - made her weak. Now on Actemra 162 mg per week.  On methotrexate 4 tablets per week for Scleroderma.  Folic acid 1 mg per day.     On Eliquis also.  History of ILD due yo Scleroderma.  No chest pain.  Not severe sob.  Skin tight in the fingers and hard to make a complete fist.  Objective:   /60   Pulse 58   Temp 99.2 °F (37.3 °C) (Temporal)   Resp 14   Ht 5' 4\" (1.626 m)   Wt 116 lb (52.6 kg)   SpO2 97%   BMI 19.91 kg/m²   Lungs clear Heart nsr  Abd ok.  Hands tight skin of the fingers   Feet discolored feet - bluish discoloration  12/4/24  CBC  9.2/11.2/36.2 / 279,000  BUN  31  Crt  1.35  GFR  40.   Assessment:     Encounter Diagnoses   Name Primary?    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  Prednisone 5 mg daily.   Folic acid 1 mg per day  Atorvastatin one daily.  Trial of Zetia currently on hold.  [place] [facility] [place] Nathan.   Eliquis one daily.    Labs with Dr. Garcia.    Note to Dr. Garcia.   RTO 6 months.         Talat Ferreira MD 4/11/2024 2:45 PM

## 2024-04-11 NOTE — PATIENT INSTRUCTIONS
Actemra 162 mg injection once a week  Methotrexate 4 tablets per week.  Prednisone 5 mg daily.   Folic acid 1 mg per day  Atorvastatin one daily.  Trial of Zetia currently on hold.  Teleconference with [facility] Dr Nathan.   Eliquis one daily.    Labs with Dr. Garcia.    Note to Dr. Garcia.   RTO 6 months.

## 2024-06-21 ENCOUNTER — TELEPHONE (OUTPATIENT)
Dept: RHEUMATOLOGY | Facility: CLINIC | Age: 79
End: 2024-06-21

## 2024-06-21 NOTE — TELEPHONE ENCOUNTER
Spoke to pt spouse, pt received a prescription for Methotrexate stating pt to take 6 tabs weekly. Pt has only been taking 4 tabs wk. Explained prescription that was sent from 2/24, and per Dr. Ferreira LOV not from 4/24 states to take 4 tabs wk. Advised to continue taking 4 tabs wk at this time.Voices understanding. Called pharmacy and notified of above

## 2024-06-24 NOTE — TELEPHONE ENCOUNTER
Patients  left a voicemail from 6/21/24. Not sure if Randee talked to him after he left the voicemail but I tried to call him back and went to voicemail. I left patient's  a message to call us back if he still had additional questions.

## 2024-08-26 RX ORDER — METHOTREXATE 2.5 MG/1
TABLET ORAL
Qty: 60 TABLET | Refills: 1 | Status: SHIPPED | OUTPATIENT
Start: 2024-08-26

## 2024-08-26 NOTE — TELEPHONE ENCOUNTER
Last office visit: 4/11/2024     Next Rheum Apt:10/10/2024 Talat Ferreira MD    Last fill: 2/5/2024   60 tabs, 1 refill     Labs:   No results found for: \"CREATSERUM\", \"GFR\", \"ALKPHO\", \"AST\", \"ALT\", \"BILT\", \"TP\", \"ALB\"    No results found for: \"WBC\", \"HGB\", \"PLT\", \"NEPRELIM\", \"NEUTABS\", \"LYMPHABS\", \"NEUT\", \"LYMPH\", \"NEPERCENT\", \"LYPERCENT\", \"NE\", \"LYMABS\"

## 2024-10-10 ENCOUNTER — OFFICE VISIT (OUTPATIENT)
Dept: RHEUMATOLOGY | Facility: CLINIC | Age: 79
End: 2024-10-10
Payer: MEDICARE

## 2024-10-10 VITALS
HEIGHT: 64 IN | TEMPERATURE: 98 F | DIASTOLIC BLOOD PRESSURE: 52 MMHG | BODY MASS INDEX: 20.49 KG/M2 | WEIGHT: 120 LBS | SYSTOLIC BLOOD PRESSURE: 116 MMHG

## 2024-10-10 DIAGNOSIS — S32.040G COMPRESSION FRACTURE OF L4 VERTEBRA WITH DELAYED HEALING: ICD-10-CM

## 2024-10-10 DIAGNOSIS — M35.9 INTERSTITIAL LUNG DISEASE DUE TO CONNECTIVE TISSUE DISEASE (HCC): ICD-10-CM

## 2024-10-10 DIAGNOSIS — J84.89 INTERSTITIAL LUNG DISEASE DUE TO CONNECTIVE TISSUE DISEASE (HCC): ICD-10-CM

## 2024-10-10 DIAGNOSIS — M34.9 SCLERODERMA (HCC): Primary | ICD-10-CM

## 2024-10-10 DIAGNOSIS — L94.3 SCLERODACTYLY: ICD-10-CM

## 2024-10-10 DIAGNOSIS — R53.82 CHRONIC FATIGUE: ICD-10-CM

## 2024-10-10 PROCEDURE — 99215 OFFICE O/P EST HI 40 MIN: CPT | Performed by: INTERNAL MEDICINE

## 2024-10-10 NOTE — PROGRESS NOTES
[facility]  Dr. Ferreira Progress Note     Subjective:   Althea Jeffries is a(n) 79 year old female.   Current complaints:   Chief Complaint   Patient presents with    CREST Scleroderma     6 month f/u. Feeling pretty good. Balance is getting worse. Neuro issues. No sores that wont heal. Skin tightening is stable. Less strength. No shortness of breath. Converted rapid score of 5.0   History of Scleroderma with ILD. . On Methotrexate 4 tablets per week, Folic acid 1 mg per day,  and Actemra weekly.    Has chronic back pain, has chronic scoliosis.  Has chronic fatigue.  Feels tired.  Legs don't work well.  Has lack of strength and balance.   Fear of falling.  Tried PT.   Dr. Nathan Scleroderma specialist at [facility], has periodic tele visits with her.   Can only walk 15-20 steps then gets tired.  Weight is stable.  Sleeps pretty well.    Limited activity.  Skin tight in the fingers.   Objective:   /52   Temp 98 °F (36.7 °C)   Ht 5' 4\" (1.626 m)   Wt 120 lb (54.4 kg)   BMI 20.60 kg/m²   On exam slender woman.  Tight skin about the bridge of the nose.  Less wrinkles on the face.  Lungs are clear.  Heart normal sinus rhythm.  Abdomen soft nontender.  Hands have sclerodactyly changes.  10/3/24  [facility]  CBC white count 7.5 hemoglobin 11.8 hematocrit 38.6   Platelet count 247,000.  Sodium 143 potassium 4.4 chloride 104 BUN 33 creatinine 1.21 GFR 45.  Calcium 9.4 glucose 112 total protein is 5.5 albumin 4.1 ALT 40 alkaline phosphatase 29 AST 24 bilirubin 0.6  7/3/2024 vitamin B12 1221.  4/17/24  Carotid US -  right carotid stenosis 70%   Left carotid stenosis    9/21/2023 CT scan of chest shows findings of chronic interstitial lung disease in similar distribution compared to previous examination.  Assessment:     Encounter Diagnoses   Name Primary?    Scleroderma (HCC) Yes    Sclerodactyly     Compression fracture of L4 vertebra with delayed healing-6-6-22     Chronic fatigue      Interstitial lung disease due to connective tissue disease (HCC)      Plan:     Patient Instructions   Obtain CPK - muscle enzyme with the next blood draw.  This Cpk test checks for muscle inflammation.  Maintain Methotrexate 4 per week.  Folic acid 2 mg per day.instead of 1 mg per day.  Actemra injection once a week  Fatigue and overall weakness due to a combination of factors, especially scleroderma, interstitial lung disease, aging.  Current CBC and Comp Profile stable.   Past thyroid testing and Vitamin B12 ok.   See Dr. Garcia periodically, as well as Dr. Nathan the [facility] Scleroderma specialist.  General labs CBC/Comp Panel/ESR every 3 months.   Return to office 6 months.          Talat Ferreira MD 10/10/2024 3:16 PM

## 2024-10-10 NOTE — PATIENT INSTRUCTIONS
Obtain CPK - muscle enzyme with the next blood draw.  This Cpk test checks for muscle inflammation.  Maintain Methotrexate 4 per week.  Folic acid 2 mg per day.instead of 1 mg per day.  Actemra injection once a week  Fatigue and overall weakness due to a combination of factors, especially scleroderma, interstitial lung disease, aging.  Current CBC and Comp Profile stable.   Past thyroid testing and Vitamin B12 ok.   See Dr. Garcia periodically, as well as Dr. Nathan the [facility] Scleroderma specialist.  General labs CBC/Comp Panel/ESR every 3 months.   Return to office 6 months.

## 2025-03-15 DIAGNOSIS — I73.00 RAYNAUD'S DISEASE WITHOUT GANGRENE: ICD-10-CM

## 2025-03-15 DIAGNOSIS — L94.3 SCLERODACTYLY: ICD-10-CM

## 2025-03-15 DIAGNOSIS — M34.9 SCLERODERMA (HCC): Primary | ICD-10-CM

## 2025-03-17 RX ORDER — FOLIC ACID 1 MG/1
2 TABLET ORAL DAILY
Qty: 180 TABLET | Refills: 3 | Status: SHIPPED | OUTPATIENT
Start: 2025-03-17

## 2025-03-17 NOTE — TELEPHONE ENCOUNTER
Folic acid 1mg    Last office visit: 10/10/24    Next Rheum Apt:4/15/2025 Talat Ferreira MD    Last fill: 3/11/24    Labs:   No results found for: \"CREATSERUM\", \"GFR\", \"ALKPHO\", \"AST\", \"ALT\", \"BILT\", \"TP\", \"ALB\"    No results found for: \"WBC\", \"HGB\", \"PLT\", \"NEPRELIM\", \"NEUTABS\", \"LYMPHABS\", \"NEUT\", \"LYMPH\", \"NEPERCENT\", \"LYPERCENT\", \"NE\", \"LYMABS\"     36.8

## 2025-03-22 DIAGNOSIS — I73.00 RAYNAUD'S DISEASE WITHOUT GANGRENE: Primary | ICD-10-CM

## 2025-03-24 RX ORDER — METHOTREXATE 2.5 MG/1
TABLET ORAL
Qty: 72 TABLET | Refills: 1 | Status: SHIPPED | OUTPATIENT
Start: 2025-03-24

## 2025-03-24 NOTE — TELEPHONE ENCOUNTER
Methotrexate refill approved 3-month supply.  6 tablets/week.  #72.  Sent to [facility] in [place].

## 2025-03-24 NOTE — TELEPHONE ENCOUNTER
Methotrexate 2.5mg    Last office visit: 10/10/24    Next Rheum Apt:4/15/2025 Talat Ferreira MD    Last fill: 8/26/24    Labs:   No results found for: \"CREATSERUM\", \"GFR\", \"ALKPHO\", \"AST\", \"ALT\", \"BILT\", \"TP\", \"ALB\"    No results found for: \"WBC\", \"HGB\", \"PLT\", \"NEPRELIM\", \"NEUTABS\", \"LYMPHABS\", \"NEUT\", \"LYMPH\", \"NEPERCENT\", \"LYPERCENT\", \"NE\", \"LYMABS\"

## 2025-04-02 ENCOUNTER — TELEPHONE (OUTPATIENT)
Facility: CLINIC | Age: 80
End: 2025-04-02

## 2025-04-02 NOTE — TELEPHONE ENCOUNTER
Called patient.  Patient should be on 4 tablets of methotrexate a week to 2 on one day, then 2 the next day.   Dr. Chavira.

## 2025-04-02 NOTE — TELEPHONE ENCOUNTER
Spoke with patient's  (HIPAA verified). States patient has been taking Methotrexate (2.5mg) 2 tabs Saturday and 2 tabs on Sunday.     Received refill for 6 tabs. Inquiring if pt. should continue with 4 tabs or with 6 tabs. Patient has been doing well with 4 tabs.

## 2025-04-21 ENCOUNTER — TELEPHONE (OUTPATIENT)
Facility: CLINIC | Age: 80
End: 2025-04-21

## 2025-04-23 NOTE — TELEPHONE ENCOUNTER
Called pts spouse an informed him that Dr. Ferreira sent over the 5 mg prednisone script to the [facility] yesterday. Pts spouse verbalized understanding  
medical evaluation

## 2025-05-15 ENCOUNTER — OFFICE VISIT (OUTPATIENT)
Dept: RHEUMATOLOGY | Facility: CLINIC | Age: 80
End: 2025-05-15
Payer: MEDICARE

## 2025-05-15 VITALS
SYSTOLIC BLOOD PRESSURE: 120 MMHG | OXYGEN SATURATION: 99 % | RESPIRATION RATE: 18 BRPM | HEART RATE: 82 BPM | HEIGHT: 64 IN | DIASTOLIC BLOOD PRESSURE: 68 MMHG | BODY MASS INDEX: 21 KG/M2

## 2025-05-15 DIAGNOSIS — J84.9 INTERSTITIAL LUNG DISEASE DUE TO COLLAGEN VASCULAR DISEASE (HCC): ICD-10-CM

## 2025-05-15 DIAGNOSIS — L94.3 SCLERODACTYLY: ICD-10-CM

## 2025-05-15 DIAGNOSIS — M35.9 INTERSTITIAL LUNG DISEASE DUE TO COLLAGEN VASCULAR DISEASE (HCC): ICD-10-CM

## 2025-05-15 DIAGNOSIS — M54.50 CHRONIC BILATERAL LOW BACK PAIN WITHOUT SCIATICA: ICD-10-CM

## 2025-05-15 DIAGNOSIS — M35.9 INTERSTITIAL LUNG DISEASE DUE TO CONNECTIVE TISSUE DISEASE (HCC): ICD-10-CM

## 2025-05-15 DIAGNOSIS — M34.9 SCLERODERMA (HCC): Primary | ICD-10-CM

## 2025-05-15 DIAGNOSIS — G89.29 CHRONIC BILATERAL LOW BACK PAIN WITHOUT SCIATICA: ICD-10-CM

## 2025-05-15 DIAGNOSIS — J84.89 INTERSTITIAL LUNG DISEASE DUE TO CONNECTIVE TISSUE DISEASE (HCC): ICD-10-CM

## 2025-05-15 DIAGNOSIS — M85.852 OSTEOPENIA OF NECK OF LEFT FEMUR: ICD-10-CM

## 2025-05-15 PROCEDURE — 99214 OFFICE O/P EST MOD 30 MIN: CPT | Performed by: INTERNAL MEDICINE

## 2025-05-15 RX ORDER — OXYBUTYNIN CHLORIDE 5 MG/1
5 TABLET, EXTENDED RELEASE ORAL NIGHTLY
COMMUNITY

## 2025-05-15 RX ORDER — ROSUVASTATIN CALCIUM 40 MG/1
40 TABLET, COATED ORAL DAILY
COMMUNITY
Start: 2025-05-12

## 2025-05-15 NOTE — PROGRESS NOTES
[facility]  Dr. Ferreira Progress Note     Subjective:   Althea Jeffries is a(n) 79 year old female.   Current complaints:   Chief Complaint   Patient presents with    Other     6 month follow up Scleroderma. States that she is not doing well   History of Scleroderma with ILD.  On Methotrexate 10 mg per week.  Folic acid 1 mg per day.  Actemra inejtion weekly.    Visited neurologist yesterday at [facility],  Cognitive   Not feeling great.  Has chronic back pain.  Back pain present for a lomg time.  Breathing is ok.  Not walking so good.  Physically weak.  Has balance issues.   Objective:   /68   Pulse 82   Resp 18   Ht 5' 4\" (1.626 m)   SpO2 99%   BMI 20.60 kg/m²   Lungs clear  Heart nsr  Abd soft non tender  Hands tight skin to the dorsum of hands,  Knees ok  Legs no edema  Lumbar spine tender.  5/12/2025 chest x-ray [facility] degenerative changes of the thoracic spine.  No acute radiographic abnormality.  Normal heart size.  4/25/25 [facility] Labs  CBC WBC 10.7 hemoglobin 11.4 hematocrit 36.9  platelet count 280,000 sodium 143 potassium 4.1 chloride 105 BUN 23 creatinine 1.31 GFR 41 calcium 9.2 glucose 66 total protein 5.6 albumin 4.0 ALT 34 alkaline phosphatase 32 AST 26 bilirubin 0.5 total cholesterol 150 triglycerides 76 HDL 57 LDL 77 TSH 2.45 vitamin D level normal at 69.  JHBA1C  -  5.6   Assessment:     Encounter Diagnoses   Name Primary?    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  Actemra 162 mg per week.    Prolia 60 mg once every 6 months.   Scleroderma appears stable.'  Interstitial lung disease is stable   Do the best you can as far as physical and mental  activity.  Try to walk with your walker.  Continue to challenge your mind.  Recent labs  with Dr. Garcia were stable.  Labs every 3 months with Dr. Garcia.  Return to rheumatology office in 6 months.         Talat Ferreira MD 5/15/2025 4:22 PM

## 2025-05-15 NOTE — PATIENT INSTRUCTIONS
Continue Methotrexate 10 mg per week.'  Folic acid 1 mg per day.  Actemra 162 mg per week.    Prolia 60 mg once every 6 months.   Scleroderma appears stable.'  Interstitial lung disease is stable   Do the best you can as far as physical and mental activity.  Try to walk with your walker.  Continue to challenge your mind.  Recent labs  with Dr. Garcia were stable.  Labs every 3 months with Dr. Garcia.  Return to rheumatology office in 6 months.

## 2025-05-21 NOTE — PATIENT INSTRUCTIONS
Called Dr Viky Leigh regarding getting a kyphoplasty at L4 for new compression fracture. He will rediscuss issue with patient. She was in the hospital on June 6 at [facility] and she deferred having the kyphoplasty 1 to see if she would heal on her own. Today is June 9 and she is in significant pain can hardly get out of bed my previous time to do the kyphoplasty. Regarding scleroderma and seems to be no different she has tight skin in her fingers some numbness in her fingers some discomfort in her fingers. No new problems. We will still try to arrange for referral to [address] [place] [address] scleroderma clinic.  [facility] In the meantime remain on methotrexate 6 tablets/week which equals 15 mg. Folic acid 1 mg/day. Calcium 500 mg with vitamin D 400 units daily. Recommend discussing with Dr. Viky Leigh use of either Fosamax or Actonel to prevent further compression fractures. Current labs show stabilized kidney liver function. Hemoglobin was 9.6 which is reasonable. Return to office for my evaluation in 1 month. No assistance needed

## 2025-06-14 DIAGNOSIS — I73.00 RAYNAUD'S DISEASE WITHOUT GANGRENE: ICD-10-CM

## 2025-06-14 DIAGNOSIS — M34.9 SCLERODERMA (HCC): ICD-10-CM

## 2025-06-14 DIAGNOSIS — L94.3 SCLERODACTYLY: ICD-10-CM

## 2025-06-16 RX ORDER — FOLIC ACID 1 MG/1
2 TABLET ORAL DAILY
Qty: 180 TABLET | Refills: 3 | OUTPATIENT
Start: 2025-06-16